# Patient Record
Sex: FEMALE | Race: WHITE | NOT HISPANIC OR LATINO | URBAN - NONMETROPOLITAN AREA
[De-identification: names, ages, dates, MRNs, and addresses within clinical notes are randomized per-mention and may not be internally consistent; named-entity substitution may affect disease eponyms.]

---

## 2018-12-11 PROBLEM — H25.13: Noted: 2018-12-11

## 2019-12-12 ENCOUNTER — IMPORTED ENCOUNTER (OUTPATIENT)
Dept: URBAN - NONMETROPOLITAN AREA CLINIC 1 | Facility: CLINIC | Age: 69
End: 2019-12-12

## 2019-12-12 PROCEDURE — 92015 DETERMINE REFRACTIVE STATE: CPT

## 2019-12-12 PROCEDURE — 92014 COMPRE OPH EXAM EST PT 1/>: CPT

## 2019-12-12 NOTE — PATIENT DISCUSSION
"Mixed Hyperopic Astigmatism OU w/ Presbyopia-  discussed refractive status with pt-  updated spectacle rx issued-  continue to monitor yearly or prnCataracts OU-  discussed findings with pt-  no treatment indicated at this time-  UV protection recommended-  continue to monitor yearly or prn w/ DFE* patient deferred all drops today stressed importance of dilated exams patient states understanding.  * stressed importance of checking IOP patient states today that she is allergic to all ""jeet"" and is terrified of having drops in her eyes.; 's Notes: MR  12/11/18DFE deferred 12/12/19"

## 2020-12-18 ENCOUNTER — IMPORTED ENCOUNTER (OUTPATIENT)
Dept: URBAN - NONMETROPOLITAN AREA CLINIC 1 | Facility: CLINIC | Age: 70
End: 2020-12-18

## 2020-12-18 PROCEDURE — 92014 COMPRE OPH EXAM EST PT 1/>: CPT

## 2020-12-18 PROCEDURE — 92015 DETERMINE REFRACTIVE STATE: CPT

## 2020-12-18 NOTE — PATIENT DISCUSSION
Hyperopia-Discussed diagnosis with patient. Astigmatism-Discussed diagnosis with patient. Presbyopia-Discussed diagnosis with patient. Updated spec Rx given. Recommend lens that will provide comfort as well as protect safety and health of eyes. Cataract OU-Not yet surgical. -Reviewed symptoms of advancing cataract growth such as glare and halos and decreased vision.-Continue to monitor for now.  Pt will notify us if any new symptoms develop.; 's Notes: MR  12/11/18DFE deferred 12/12/19

## 2021-12-22 ENCOUNTER — IMPORTED ENCOUNTER (OUTPATIENT)
Dept: URBAN - NONMETROPOLITAN AREA CLINIC 1 | Facility: CLINIC | Age: 71
End: 2021-12-22

## 2021-12-22 PROCEDURE — 92014 COMPRE OPH EXAM EST PT 1/>: CPT

## 2021-12-22 PROCEDURE — 92015 DETERMINE REFRACTIVE STATE: CPT

## 2021-12-22 NOTE — PATIENT DISCUSSION
Compound Hyperopic Astigmatism OU w/Presbyopia-  discussed findings w/patient-  new spectacle Rx issued-  continue to monitor yearly or prnCataracts OU-  discussed findings w/patient-  no treatment indicated at this time-  continue to monitor yearly or prn; 's Notes: MR  12/22/2021DFE deferred 12/22/2021

## 2022-04-09 ASSESSMENT — VISUAL ACUITY
OS_SC: 20/25-1
OS_GLARE: 20/40-2
OD_SC: 20/30-1
OS_SC: 20/25-2
OD_GLARE: 20/40
OD_SC: 20/25
OD_SC: 20/25 SLOW
OS_SC: 20/30

## 2022-04-09 ASSESSMENT — TONOMETRY
OD_IOP_MMHG: 14
OD_IOP_MMHG: 13
OS_IOP_MMHG: 10
OS_IOP_MMHG: 12

## 2023-07-20 ENCOUNTER — COMPREHENSIVE EXAM (OUTPATIENT)
Dept: URBAN - NONMETROPOLITAN AREA CLINIC 4 | Facility: CLINIC | Age: 73
End: 2023-07-20

## 2023-07-20 DIAGNOSIS — H25.13: ICD-10-CM

## 2023-07-20 DIAGNOSIS — H52.223: ICD-10-CM

## 2023-07-20 DIAGNOSIS — H52.4: ICD-10-CM

## 2023-07-20 DIAGNOSIS — H52.03: ICD-10-CM

## 2023-07-20 PROCEDURE — 92014 COMPRE OPH EXAM EST PT 1/>: CPT

## 2023-07-20 PROCEDURE — 92015 DETERMINE REFRACTIVE STATE: CPT

## 2023-07-20 ASSESSMENT — TONOMETRY
OD_IOP_MMHG: 14
OS_IOP_MMHG: 14

## 2023-07-20 ASSESSMENT — VISUAL ACUITY
OS_CC: 20/40
OU_CC: 20/20
OD_CC: 20/40

## 2023-10-23 ENCOUNTER — EMERGENCY VISIT (OUTPATIENT)
Dept: URBAN - METROPOLITAN AREA CLINIC 1 | Facility: CLINIC | Age: 73
End: 2023-10-23

## 2023-10-23 DIAGNOSIS — H01.021: ICD-10-CM

## 2023-10-23 DIAGNOSIS — H01.024: ICD-10-CM

## 2023-10-23 DIAGNOSIS — H00.11: ICD-10-CM

## 2023-10-23 PROCEDURE — 99213 OFFICE O/P EST LOW 20 MIN: CPT

## 2023-10-23 RX ORDER — CEPHALEXIN 500 MG/1: 1 CAPSULE ORAL TWICE A DAY

## 2023-10-23 ASSESSMENT — TONOMETRY
OD_IOP_MMHG: 14
OS_IOP_MMHG: 14

## 2025-08-25 ENCOUNTER — COMPREHENSIVE EXAM (OUTPATIENT)
Age: 75
End: 2025-08-25

## 2025-08-25 DIAGNOSIS — H01.024: ICD-10-CM

## 2025-08-25 DIAGNOSIS — H25.813: ICD-10-CM

## 2025-08-25 DIAGNOSIS — H01.021: ICD-10-CM

## 2025-08-25 PROCEDURE — 92014 COMPRE OPH EXAM EST PT 1/>: CPT
